# Patient Record
Sex: FEMALE | Race: WHITE | Employment: PART TIME | URBAN - NONMETROPOLITAN AREA
[De-identification: names, ages, dates, MRNs, and addresses within clinical notes are randomized per-mention and may not be internally consistent; named-entity substitution may affect disease eponyms.]

---

## 2017-03-24 ENCOUNTER — OFFICE VISIT (OUTPATIENT)
Dept: PRIMARY CARE CLINIC | Age: 27
End: 2017-03-24
Payer: COMMERCIAL

## 2017-03-24 VITALS
TEMPERATURE: 97 F | BODY MASS INDEX: 28.88 KG/M2 | WEIGHT: 195 LBS | OXYGEN SATURATION: 98 % | HEIGHT: 69 IN | SYSTOLIC BLOOD PRESSURE: 100 MMHG | DIASTOLIC BLOOD PRESSURE: 60 MMHG | HEART RATE: 68 BPM

## 2017-03-24 DIAGNOSIS — B35.4 TINEA CORPORIS: Primary | ICD-10-CM

## 2017-03-24 PROCEDURE — 99213 OFFICE O/P EST LOW 20 MIN: CPT | Performed by: PHYSICIAN ASSISTANT

## 2017-03-24 RX ORDER — TERBINAFINE HYDROCHLORIDE 250 MG/1
250 TABLET ORAL DAILY
Qty: 14 TABLET | Refills: 0 | Status: SHIPPED | OUTPATIENT
Start: 2017-03-24 | End: 2017-04-07

## 2017-03-24 RX ORDER — NORETHINDRONE ACETATE AND ETHINYL ESTRADIOL 1MG-20(21)
1 KIT ORAL DAILY
COMMUNITY
End: 2017-08-22

## 2017-03-24 RX ORDER — CLOTRIMAZOLE 1 %
CREAM (GRAM) TOPICAL
Qty: 45 G | Refills: 1 | Status: SHIPPED | OUTPATIENT
Start: 2017-03-24 | End: 2017-03-31

## 2017-03-24 ASSESSMENT — ENCOUNTER SYMPTOMS
SHORTNESS OF BREATH: 0
COUGH: 0

## 2017-04-17 ENCOUNTER — OFFICE VISIT (OUTPATIENT)
Dept: PRIMARY CARE CLINIC | Age: 27
End: 2017-04-17
Payer: COMMERCIAL

## 2017-04-17 VITALS
RESPIRATION RATE: 16 BRPM | HEART RATE: 71 BPM | HEIGHT: 69 IN | OXYGEN SATURATION: 96 % | SYSTOLIC BLOOD PRESSURE: 110 MMHG | DIASTOLIC BLOOD PRESSURE: 84 MMHG | TEMPERATURE: 97.5 F | WEIGHT: 193 LBS | BODY MASS INDEX: 28.58 KG/M2

## 2017-04-17 DIAGNOSIS — B35.4 TINEA CORPORIS: Primary | ICD-10-CM

## 2017-04-17 PROCEDURE — 99213 OFFICE O/P EST LOW 20 MIN: CPT | Performed by: NURSE PRACTITIONER

## 2017-04-17 RX ORDER — FLUCONAZOLE 150 MG/1
300 TABLET ORAL
Qty: 6 TABLET | Refills: 0 | Status: SHIPPED | OUTPATIENT
Start: 2017-04-17 | End: 2017-05-02

## 2017-04-17 ASSESSMENT — ENCOUNTER SYMPTOMS: RESPIRATORY NEGATIVE: 1

## 2017-08-22 ENCOUNTER — OFFICE VISIT (OUTPATIENT)
Dept: PODIATRY | Age: 27
End: 2017-08-22
Payer: COMMERCIAL

## 2017-08-22 VITALS
WEIGHT: 193 LBS | HEIGHT: 69 IN | DIASTOLIC BLOOD PRESSURE: 70 MMHG | BODY MASS INDEX: 28.58 KG/M2 | RESPIRATION RATE: 20 BRPM | HEART RATE: 80 BPM | SYSTOLIC BLOOD PRESSURE: 120 MMHG

## 2017-08-22 DIAGNOSIS — L60.8 ACQUIRED DEFORMITY OF NAIL: ICD-10-CM

## 2017-08-22 DIAGNOSIS — S90.212A CONTUSION OF LEFT GREAT TOE WITH DAMAGE TO NAIL, INITIAL ENCOUNTER: Primary | ICD-10-CM

## 2017-08-22 PROCEDURE — 99213 OFFICE O/P EST LOW 20 MIN: CPT | Performed by: PODIATRIST

## 2017-08-22 RX ORDER — ACETAMINOPHEN 325 MG/1
TABLET ORAL
COMMUNITY

## 2017-08-22 RX ORDER — TRAMADOL HYDROCHLORIDE 50 MG/1
50 TABLET ORAL
COMMUNITY
Start: 2017-08-17 | End: 2017-08-22

## 2020-08-26 ENCOUNTER — OFFICE VISIT (OUTPATIENT)
Dept: PRIMARY CARE CLINIC | Age: 30
End: 2020-08-26
Payer: COMMERCIAL

## 2020-08-26 VITALS
TEMPERATURE: 98.2 F | HEART RATE: 74 BPM | OXYGEN SATURATION: 98 % | WEIGHT: 235 LBS | BODY MASS INDEX: 34.7 KG/M2 | SYSTOLIC BLOOD PRESSURE: 112 MMHG | DIASTOLIC BLOOD PRESSURE: 74 MMHG

## 2020-08-26 PROCEDURE — 96372 THER/PROPH/DIAG INJ SC/IM: CPT | Performed by: PHYSICIAN ASSISTANT

## 2020-08-26 RX ORDER — METHYLPREDNISOLONE 4 MG/1
TABLET ORAL
Qty: 1 KIT | Refills: 0 | Status: SHIPPED | OUTPATIENT
Start: 2020-08-26 | End: 2020-11-10 | Stop reason: ALTCHOICE

## 2020-08-26 RX ORDER — CITALOPRAM 20 MG/1
20 TABLET ORAL DAILY
COMMUNITY
End: 2020-11-10 | Stop reason: ALTCHOICE

## 2020-08-26 RX ORDER — TRIAMCINOLONE ACETONIDE 40 MG/ML
40 INJECTION, SUSPENSION INTRA-ARTICULAR; INTRAMUSCULAR ONCE
Status: COMPLETED | OUTPATIENT
Start: 2020-08-26 | End: 2020-08-26

## 2020-08-26 RX ADMIN — TRIAMCINOLONE ACETONIDE 40 MG: 40 INJECTION, SUSPENSION INTRA-ARTICULAR; INTRAMUSCULAR at 19:43

## 2020-08-26 ASSESSMENT — ENCOUNTER SYMPTOMS
COUGH: 0
VOMITING: 0
DIARRHEA: 0
SHORTNESS OF BREATH: 0
WHEEZING: 0
NAUSEA: 0

## 2020-08-26 ASSESSMENT — PATIENT HEALTH QUESTIONNAIRE - PHQ9
SUM OF ALL RESPONSES TO PHQ QUESTIONS 1-9: 0
SUM OF ALL RESPONSES TO PHQ QUESTIONS 1-9: 0
SUM OF ALL RESPONSES TO PHQ9 QUESTIONS 1 & 2: 0
2. FEELING DOWN, DEPRESSED OR HOPELESS: 0
1. LITTLE INTEREST OR PLEASURE IN DOING THINGS: 0

## 2020-08-26 NOTE — PROGRESS NOTES
[facility]    Subjective:      Patient ID: Bethanie Mclaughlin is a 30 y.o. y.o. female. Patient is seen due to rash on anterior chest under breasts and abdomen and wrists and left 4th finger base web and right upper arm and axilla. Slight on the proximal left forearm dorsum aspect. Legs and feet are spared. Moved some wood 8/21/2020 and noted the rash shortly afterwards. Is six months postpartum. Itchy. No one else has. Past Medical History:   Diagnosis Date    Asthma     seasonal       Past Surgical History:   Procedure Laterality Date    TOENAIL EXCISION      WISDOM TOOTH EXTRACTION         History reviewed. No pertinent family history. Allergies   Allergen Reactions    Bactrim [Sulfamethoxazole-Trimethoprim] Hives    Lamictal [Lamotrigine] Hives       Current Outpatient Medications   Medication Sig Dispense Refill    citalopram (CELEXA) 20 MG tablet Take 20 mg by mouth daily      HYDROXYZINE HCL PO Take by mouth      methylPREDNISolone (MEDROL, RAFAT,) 4 MG tablet Take by mouth. 1 kit 0    acetaminophen (TYLENOL) 325 MG tablet        No current facility-administered medications for this visit. Review of Systems   Constitutional: Negative for appetite change, fatigue and fever. HENT:        Had sinus infection 2 weeks ago. Respiratory: Negative for cough, shortness of breath and wheezing. Cardiovascular: Negative for chest pain and palpitations. Gastrointestinal: Negative for diarrhea, nausea and vomiting. Genitourinary: Negative for difficulty urinating. Musculoskeletal: Negative for myalgias. Skin: Negative for rash. Neurological: Negative for dizziness, light-headedness, numbness and headaches. Psychiatric/Behavioral: Positive for sleep disturbance. The patient is not nervous/anxious.           Objective:      /74 (Site: Left Upper Arm, Position: Sitting)   Pulse 74   Temp 98.2 °F (36.8 °C) (Tympanic)   Wt 235 lb (106.6 kg)   SpO2 98%   BMI 34.70 kg/m²     Physical Exam  Vitals signs and nursing note reviewed. Constitutional:       General: She is not in acute distress. Appearance: Normal appearance. She is well-developed. She is obese. HENT:      Head: Normocephalic and atraumatic. Right Ear: External ear normal.      Left Ear: External ear normal.      Nose: Nose normal. No rhinorrhea. Eyes:      General: No scleral icterus. Pulmonary:      Effort: Pulmonary effort is normal.   Musculoskeletal:         General: No swelling or tenderness. Skin:     General: Skin is warm and dry. Findings: Erythema and rash present. Comments: Erythema under both breasts. Scratch marks noted on the anterior chest as well. Erythema in both axilla and on the right upper arm bicep area. Slight erythema and papular lesions noted on the proximal left forearm. She has a few papular lesions on the fingers especially on the left fourth finger at the finger web. No pustular vesicular lesions noted. Neurological:      General: No focal deficit present. Mental Status: She is alert and oriented to person, place, and time. Gait: Gait normal.   Psychiatric:         Mood and Affect: Mood normal.         Behavior: Behavior normal.         Thought Content: Thought content normal.         Judgment: Judgment normal.           Assessment & Plan:     1. Plant dermatitis    - triamcinolone acetonide (KENALOG-40) injection 40 mg  - methylPREDNISolone (MEDROL, RAFAT,) 4 MG tablet; Take by mouth. Dispense: 1 kit;  Refill: 0      Answered her questions  Can start Medrol tomorrow  Over-the-counter antihistamines as needed  Follow-up not improving or worsens    Daniel Martinez  9/1/2020 7:30 PM EDT    (Pleasenote that portions of this note were completed with a voice recognition program.Efforts were made to edit the dictations but occasionally words are mis-transcribed.)

## 2020-11-10 ENCOUNTER — OFFICE VISIT (OUTPATIENT)
Dept: PODIATRY | Age: 30
End: 2020-11-10
Payer: COMMERCIAL

## 2020-11-10 VITALS
BODY MASS INDEX: 34.26 KG/M2 | HEART RATE: 64 BPM | RESPIRATION RATE: 20 BRPM | SYSTOLIC BLOOD PRESSURE: 124 MMHG | DIASTOLIC BLOOD PRESSURE: 72 MMHG | WEIGHT: 232 LBS

## 2020-11-10 PROCEDURE — 99202 OFFICE O/P NEW SF 15 MIN: CPT | Performed by: PODIATRIST

## 2020-11-10 RX ORDER — VORTIOXETINE 10 MG/1
TABLET, FILM COATED ORAL
COMMUNITY
Start: 2020-11-04 | End: 2022-02-23

## 2020-11-10 NOTE — PROGRESS NOTES
Subjective:  Emily Crawford is a 35 y.o. female who presents to the office today complaining of thick deformed nail L big toe. .  Symptoms began  year(s) ago. Patient relates pain is Absent . Pain is rated 0 out of 10 and is described as none. Treatments prior to today's visit include: cutting back at home. No recent infx or pain. .  Currently denies F/C/N/V. Allergies   Allergen Reactions    Sulfamethoxazole-Trimethoprim Hives     Bactrim    Lamotrigine Hives and Rash       Past Medical History:   Diagnosis Date    Asthma     seasonal       Prior to Admission medications    Medication Sig Start Date End Date Taking? Authorizing Provider   TRINTELLIX 10 MG TABS tablet TAKE 1 TABLET BY MOUTH EVERY DAY 11/4/20  Yes Historical Provider, MD   acetaminophen (TYLENOL) 325 MG tablet    Yes Historical Provider, MD       Past Surgical History:   Procedure Laterality Date    TOENAIL EXCISION      [address]AL LIGATION  07/2020    [address]    WISDOM TOOTH EXTRACTION         History reviewed. No pertinent family history. Social History     Tobacco Use    Smoking status: Never Smoker    Smokeless tobacco: Never Used   Substance Use Topics    Alcohol use: Yes     Comment: social       ROS: All 14 ROS systems reviewed and pertinent positives noted above, all others negative. Lower Extremity Physical Examination:     Vitals:   Vitals:    11/10/20 1301   BP: 124/72   Pulse: 64   Resp: 20     General: AAO x 3 in NAD. Vascular: DP and PT pulses palpable 2/4, bilateral.  CFT <3 seconds, bilateral.  Hair growth present to the level of the digits, bilateral.  Edema absent, bilateral.  Varicosities absent, bilateral. Erythema absent, bilateral. Distal Rubor absent bilateral.  Temperature within normal limits bilateral. Hyperpigmentation absent bilateral. No atrophic skin.   Neurological: Sensation intact to light touch to level of digits, bilateral.  Protective sensation intact 10/10 sites via 5.07/10g [place] Monofilament, bilateral.  negative Tinel's, bilateral.  negative Valleix sign, bilateral.  Vibratory intact bilateral.  Reflexes Decreased bilateral.  Paresthesias negative. Dysthesias negative. Sharp/dull intact bilateral.    Musculoskeletal: Muscle strength 5/5, bilateral.  Pain absent upon palpation bilateral. Normal medial longitudinal arch, bilateral.  Ankle ROM within normal limits,bilateral.  1st MPJ ROM within normal limits, bilateral.  Dorsally contracted digits absent. No other foot deformities. Integument: Warm, dry, supple, bilateral.  Open lesion absent, bilateral.  Interdigital maceration absent to web spaces bilateral.   Nails left 1 and right none thickened, dystrophic and crumbly, discolored with subungual debris. Fissures absent, bilateral. Hyperkeratotic tissue is absent. Asessment: Patient is a 35 y.o. female with:    Diagnosis Orders   1. Deformity of nail bed         Plan: Patient examined and evaluated. Current condition and treatment options discussed in detail. Pt will focus on softening the nails, use urea cream 40% OTC. Patient examined and evaluated. Current condition and treatment options discussed in detail. No current need for Slant back nail cut took place of L hallux nail. No need for nail procedure currently, any increase in pain of signs of infection then patient will be seen back for a nail procedure. Patient will soak qd in warm soapy water or epsom salts and will use OTC antibiotic ointment daily. Contact office with any questions/problems/concerns.   RTC in PRN

## 2022-02-23 ENCOUNTER — OFFICE VISIT (OUTPATIENT)
Dept: PRIMARY CARE CLINIC | Age: 32
End: 2022-02-23
Payer: COMMERCIAL

## 2022-02-23 ENCOUNTER — HOSPITAL ENCOUNTER (OUTPATIENT)
Dept: GENERAL RADIOLOGY | Age: 32
Discharge: HOME | End: 2022-02-25
Payer: COMMERCIAL

## 2022-02-23 ENCOUNTER — HOSPITAL ENCOUNTER (OUTPATIENT)
Age: 32
End: 2022-02-23
Payer: COMMERCIAL

## 2022-02-23 VITALS
TEMPERATURE: 97.9 F | HEIGHT: 69 IN | RESPIRATION RATE: 18 BRPM | DIASTOLIC BLOOD PRESSURE: 70 MMHG | HEART RATE: 79 BPM | BODY MASS INDEX: 37.4 KG/M2 | WEIGHT: 252.5 LBS | OXYGEN SATURATION: 98 % | SYSTOLIC BLOOD PRESSURE: 110 MMHG

## 2022-02-23 DIAGNOSIS — S99.911A INJURY OF RIGHT ANKLE, INITIAL ENCOUNTER: ICD-10-CM

## 2022-02-23 DIAGNOSIS — S93.401A SPRAIN OF RIGHT ANKLE, UNSPECIFIED LIGAMENT, INITIAL ENCOUNTER: Primary | ICD-10-CM

## 2022-02-23 PROCEDURE — 99203 OFFICE O/P NEW LOW 30 MIN: CPT | Performed by: NURSE PRACTITIONER

## 2022-02-23 PROCEDURE — 73610 X-RAY EXAM OF ANKLE: CPT

## 2022-02-23 RX ORDER — BUPROPION HYDROCHLORIDE 150 MG/1
150 TABLET ORAL DAILY
COMMUNITY
Start: 2022-02-22

## 2022-02-23 RX ORDER — HYDROXYZINE HYDROCHLORIDE 25 MG/1
TABLET, FILM COATED ORAL
COMMUNITY
Start: 2022-02-14

## 2022-02-23 RX ORDER — ACETAMINOPHEN 160 MG
TABLET,DISINTEGRATING ORAL
COMMUNITY
Start: 2022-02-14

## 2022-02-23 ASSESSMENT — PATIENT HEALTH QUESTIONNAIRE - PHQ9
2. FEELING DOWN, DEPRESSED OR HOPELESS: 0
SUM OF ALL RESPONSES TO PHQ9 QUESTIONS 1 & 2: 0
1. LITTLE INTEREST OR PLEASURE IN DOING THINGS: 0
SUM OF ALL RESPONSES TO PHQ QUESTIONS 1-9: 0

## 2022-02-23 NOTE — PROGRESS NOTES
Subjective:      Patient ID: Vamsi Warner is a 28 y.o. female coming in for   Chief Complaint   Patient presents with    Ankle Pain     Right ankle. Swollen. x1 Week. Was walking downstairs, heard a crack, then it has been sore. Ankle Pain   The incident occurred 5 to 7 days ago. The incident occurred at home. The injury mechanism was a twisting injury (reports walking downstairs and heard a crack. pain with plantar flexion). Pain location: right lateral ankle. The pain is moderate. Pertinent negatives include no inability to bear weight. The symptoms are aggravated by weight bearing and movement. She has tried nothing for the symptoms. Review of Systems   Musculoskeletal: Positive for arthralgias and joint swelling. All other systems reviewed and are negative. Objective:  /70 (Site: Left Upper Arm, Position: Sitting, Cuff Size: Medium Adult)   Pulse 79   Temp 97.9 °F (36.6 °C) (Tympanic)   Resp 18   Ht 5' 9\" (1.753 m)   Wt 252 lb 8 oz (114.5 kg)   LMP 02/09/2022 (Within Days)   SpO2 98%   BMI 37.29 kg/m²      Physical Exam  Vitals and nursing note reviewed. Constitutional:       Appearance: Normal appearance. She is obese. Pulmonary:      Effort: Pulmonary effort is normal.   Musculoskeletal:      Right ankle: Swelling present. Tenderness present over the lateral malleolus and base of 5th metatarsal. Normal range of motion. Right Achilles Tendon: No tenderness or defects. Morton's test negative. Neurological:      General: No focal deficit present. Mental Status: She is alert and oriented to person, place, and time. Assessment:      1.  Injury of right ankle, initial encounter           Plan:   -xray negative for any acute fracture  -recommended RICE therapy  -motrin for pain/swelling  -if pain/swelling persists past one month, recommended ortho appt      Orders Placed This Encounter   Procedures    XR ANKLE RIGHT (MIN 3 VIEWS)     Standing Status: Future     Standing Expiration Date:   2/23/2023     Order Specific Question:   Reason for exam:     Answer:   injured ankle coming down stairs, heard a pop, pain to lateral aspect      Outpatient Encounter Medications as of 2/23/2022   Medication Sig Dispense Refill    buPROPion (WELLBUTRIN XL) 150 MG extended release tablet Take 150 mg by mouth daily      hydrOXYzine (ATARAX) 25 MG tablet TAKE 1 TABLET BY MOUTH EVERY EIGHT HOURS AS NEEDED FOR ITCHING OR ANXIETY (GENERIC FOR ATARAX)      Cholecalciferol (VITAMIN D3) 50 MCG (2000 UT) CAPS TAKE 5 CAPSULES BY MOUTH EVERY DAY      [DISCONTINUED] TRINTELLIX 10 MG TABS tablet TAKE 1 TABLET BY MOUTH EVERY DAY (Patient not taking: Reported on 2/23/2022)      acetaminophen (TYLENOL) 325 MG tablet        No facility-administered encounter medications on file as of 2/23/2022.             Maria Fernanda Riojas, APRN - CNP

## 2022-03-07 ENCOUNTER — TELEPHONE (OUTPATIENT)
Dept: PRIMARY CARE CLINIC | Age: 32
End: 2022-03-07

## 2022-03-07 DIAGNOSIS — S93.401A SPRAIN OF RIGHT ANKLE, UNSPECIFIED LIGAMENT, INITIAL ENCOUNTER: Primary | ICD-10-CM

## 2022-03-10 ENCOUNTER — OFFICE VISIT (OUTPATIENT)
Dept: ORTHOPEDIC SURGERY | Age: 32
End: 2022-03-10
Payer: COMMERCIAL

## 2022-03-10 VITALS
DIASTOLIC BLOOD PRESSURE: 82 MMHG | HEART RATE: 88 BPM | BODY MASS INDEX: 37.33 KG/M2 | WEIGHT: 252 LBS | HEIGHT: 69 IN | SYSTOLIC BLOOD PRESSURE: 126 MMHG

## 2022-03-10 DIAGNOSIS — M76.71 PERONEAL TENDINITIS, RIGHT: Primary | ICD-10-CM

## 2022-03-10 PROCEDURE — 99203 OFFICE O/P NEW LOW 30 MIN: CPT | Performed by: FAMILY MEDICINE

## 2022-03-10 PROCEDURE — 99212 OFFICE O/P EST SF 10 MIN: CPT | Performed by: FAMILY MEDICINE

## 2022-03-10 PROCEDURE — L1902 AFO ANKLE GAUNTLET PRE OTS: HCPCS | Performed by: FAMILY MEDICINE

## 2022-03-10 NOTE — PROGRESS NOTES
Sports Medicine Consultation      CHIEF COMPLAINT:  Ankle Pain (right ankle pain-left heel)  . HPI:   The patient is a 28 y.o. female who is being seen in  new patient being seen for regarding new problem  right foot/ankle pain. The patient states the pain has been present for one  month. As far as trauma to the area, the patient indicates No trauma, but felt a pop walking down the stairs. There is not pain with weight bearing. The patient states numbness and tingling is not present. Catching and locking has not been present. She has tried Ice, Motrin without relief. she has a past medical history of Asthma. she has a past surgical history that includes [place] tooth extraction; toenail excision; and Tubal ligation (07/2020). family history is not on file. Social History     Socioeconomic History    Marital status:      Spouse name: Not on file    Number of children: Not on file    Years of education: Not on file    Highest education level: Not on file   Occupational History    Not on file   Tobacco Use    Smoking status: Never Smoker    Smokeless tobacco: Never Used   Substance and Sexual Activity    Alcohol use: Yes     Comment: social    Drug use: No    Sexual activity: Not on file   Other Topics Concern    Not on file   Social History Narrative    Not on file     Social Determinants of Health     Financial Resource Strain:     Difficulty of Paying Living Expenses: Not on file   Food Insecurity:     Worried About Running Out of Food in the Last Year: Not on file    Reza of Food in the Last Year: Not on file   Transportation Needs:     Lack of Transportation (Medical): Not on file    Lack of Transportation (Non-Medical):  Not on file   Physical Activity:     Days of Exercise per Week: Not on file    Minutes of Exercise per Session: Not on file   Stress:     Feeling of Stress : Not on file   Social Connections:     Frequency of Communication with 126/82   Pulse 88   Ht 5' 9\" (1.753 m)   Wt 252 lb (114.3 kg)   LMP 02/09/2022 (Within Days)   BMI 37.21 kg/m²   GENERAL: Amando Garcia is a 28 y.o. female who is alert and oriented and sitting comfortably in our office. SKIN:  Intact without rashes, lesions or ulcerations. No obvious deformity or swelling. NEURO: Musculoskeletal and axillary nerves intact to sensory and motor testing. EYES:  Extraocular muscles intact. MOUTH: Oral mucosa moist.  No perioral lesions. PULM:  Respirations unlabored and regular. VASC:  Capillary refill less than 3 seconds. Distal pulses are palpable. There is no lymphadenopathy. Ankle Exam:    Reveals there is not effusion. Swelling is not present. Edema is not present. Ecchymoses is not present. Palpation-Tenderness peroneal tendon  The foot is in functional planus alignment. ROM:  40 degrees plantarflexion and 20 degrees dorsiflexion. Subtalar motion is 30 degrees inversion and 20 degrees eversion. Strength-Reduced resulting in pain w eversion  Sensation-normal to light touch  Special Tests-Ankle inversion: laxity negative  Ankle eversion: laxity negative  Ankle drawer: laxity negative  External rotation:positive  Syndesmotic Squeeze test: negative    Gait: Normal    PSYCH:  Patient has good fund of knowledge and displays understanding of exam.    RADIOLOGY: XR ANKLE RIGHT (MIN 3 VIEWS)    Result Date: 2/23/2022  Soft tissue swelling. No acute fracture. Symmetric ankle mortise. IMPRESSION:     1. Peroneal tendinitis, right        PLAN:   We discussed some of the etiologies and natural histories of     ICD-10-CM    1. Peroneal tendinitis, right  M76.71 Afo ankle gauntlet pre ots    We discussed the various treatment alternatives including anti-inflammatory medications, physical therapy, injections, further imaging studies and as a last resort surgery.   At this point patient has some peroneal tendinitis I think this will improve over the next few weeks we will place her in an ankle brace we did advise her that therapy would help she has elected to try the brace first before any further interventions. She voiced understanding agreement this plan    No follow-ups on file. Please be aware portions of this note were completed using voice recognition software and unforeseen errors may have occurred    Electronically signed by Nikki Feliciano DO, FAOASM  on 3/10/22 at 8:35 AM EST        Procedures    Afo ankle gauntlet pre ots     Patient was prescribed an DJO Pro Stabilizing Ankle Brace. The right ankle will require stabilization / immobilization from this semi-rigid / rigid orthosis to improve their function. The orthosis will assist in protecting the affected area, provide functional support and facilitate healing. The patient was educated and fit by a healthcare professional with expert knowledge and specialization in brace application while under the direct supervision of the treating physician. Verbal and written instructions for the use of and application of this item were provided. They were instructed to contact the office immediately should the brace result in increased pain, decreased sensation, increased swelling or worsening of the condition.